# Patient Record
Sex: FEMALE | Race: WHITE | URBAN - METROPOLITAN AREA
[De-identification: names, ages, dates, MRNs, and addresses within clinical notes are randomized per-mention and may not be internally consistent; named-entity substitution may affect disease eponyms.]

---

## 2020-11-26 ENCOUNTER — OFFICE VISIT (OUTPATIENT)
Dept: URBAN - METROPOLITAN AREA CLINIC 37 | Facility: CLINIC | Age: 65
End: 2020-11-26

## 2020-12-01 ENCOUNTER — TELEPHONE ENCOUNTER (OUTPATIENT)
Dept: URBAN - METROPOLITAN AREA CLINIC 35 | Facility: CLINIC | Age: 65
End: 2020-12-01

## 2020-12-03 ENCOUNTER — LAB OUTSIDE AN ENCOUNTER (OUTPATIENT)
Dept: URBAN - METROPOLITAN AREA CLINIC 23 | Facility: CLINIC | Age: 65
End: 2020-12-03

## 2020-12-03 ENCOUNTER — OFFICE VISIT (OUTPATIENT)
Dept: URBAN - METROPOLITAN AREA CLINIC 12 | Facility: CLINIC | Age: 65
End: 2020-12-03
Payer: COMMERCIAL

## 2020-12-03 ENCOUNTER — DASHBOARD ENCOUNTERS (OUTPATIENT)
Age: 65
End: 2020-12-03

## 2020-12-03 DIAGNOSIS — K57.90 DIVERTICULOSIS: ICD-10-CM

## 2020-12-03 DIAGNOSIS — K92.2 GI BLEED: ICD-10-CM

## 2020-12-03 DIAGNOSIS — D64.9 ANEMIA: ICD-10-CM

## 2020-12-03 LAB
ABSOLUTE BASOPHIL COUNT: 0.04
ABSOLUTE EOSINOPHIL COUNT: 0.05
ABSOLUTE IMMATURE GRANULOCYTE  COUNT: 0.02
ABSOLUTE LYMPHOCYTE COUNT: 1.37
ABSOLUTE MONOCYTE COUNT: 0.36
ABSOLUTE NEUTROPHIL COUNT (ANC): 4.9
ABSOLUTE NRBC  COUNT: 0
BASOPHIL AUTO: 1
EOS AUTO: 1
HCT: 35.6
HGB: 11.6
IMMATURE GRANULOCYTES AUTO: 0.3
LYMPH AUTO: 20
MCH: 31.5
MCHC: 32.6
MCV: 96.7
MONO AUTO: 5
MPV: 12.6
NEUTRO AUTO: 73
NRBC AUTO: 0
PERFORMING LAB: (no result)
PERFORMING LAB: (no result)
PLATELETS: 143
RBC: 3.68
RDW: 13.9
WBC: 6.7

## 2020-12-03 PROCEDURE — G8420 CALC BMI NORM PARAMETERS: HCPCS | Performed by: INTERNAL MEDICINE

## 2020-12-03 PROCEDURE — 99204 OFFICE O/P NEW MOD 45 MIN: CPT | Performed by: INTERNAL MEDICINE

## 2020-12-03 PROCEDURE — G8482 FLU IMMUNIZE ORDER/ADMIN: HCPCS | Performed by: INTERNAL MEDICINE

## 2020-12-03 PROCEDURE — 3017F COLORECTAL CA SCREEN DOC REV: CPT | Performed by: INTERNAL MEDICINE

## 2020-12-03 PROCEDURE — G9903 PT SCRN TBCO ID AS NON USER: HCPCS | Performed by: INTERNAL MEDICINE

## 2020-12-03 PROCEDURE — G8427 DOCREV CUR MEDS BY ELIG CLIN: HCPCS | Performed by: INTERNAL MEDICINE

## 2020-12-03 NOTE — HPI-TODAY'S VISIT:
64-year-old female presented today for Follow-up after recent admission to [facility] November 27, 2020 for acute onset abdominal pain and hematochezia, she was admitted to the hospital for lower Gi bleeding , she had emergency colonoscopy was told she has diverticulosis of possible colitis. I do not have the actual report at the time of this visit, she feels better now but  her hemoglobin at the hospital dropped from 12 to 8, she has not had  recurrent bleeding since discharge she was told she had a Follow-up colonoscopy with better preparation. No abdominal pain at this time no nausea no vomiting

## 2020-12-04 ENCOUNTER — WEB ENCOUNTER (OUTPATIENT)
Dept: URBAN - METROPOLITAN AREA MEDICAL CENTER 27 | Facility: MEDICAL CENTER | Age: 65
End: 2020-12-04

## 2020-12-06 PROBLEM — 398050005 DIVERTICULAR DISEASE OF COLON: Status: ACTIVE | Noted: 2020-12-03

## 2020-12-28 ENCOUNTER — CLAIMS CREATED FROM THE CLAIM WINDOW (OUTPATIENT)
Dept: URBAN - METROPOLITAN AREA CLINIC 4 | Facility: CLINIC | Age: 65
End: 2020-12-28
Payer: COMMERCIAL

## 2020-12-28 ENCOUNTER — OFFICE VISIT (OUTPATIENT)
Dept: URBAN - METROPOLITAN AREA SURGERY CENTER 15 | Facility: SURGERY CENTER | Age: 65
End: 2020-12-28
Payer: COMMERCIAL

## 2020-12-28 DIAGNOSIS — D50.9 ANEMIA, IRON DEFICIENCY: ICD-10-CM

## 2020-12-28 DIAGNOSIS — K63.89 OTHER SPECIFIED DISEASES OF INTESTINE: ICD-10-CM

## 2020-12-28 DIAGNOSIS — K92.2 ACUTE GASTROINTESTINAL BLEEDING: ICD-10-CM

## 2020-12-28 DIAGNOSIS — K62.1 DYSPLASTIC POLYP OF RECTUM: ICD-10-CM

## 2020-12-28 PROCEDURE — G9937 DIG OR SURV COLSCO: HCPCS | Performed by: INTERNAL MEDICINE

## 2020-12-28 PROCEDURE — 88305 TISSUE EXAM BY PATHOLOGIST: CPT | Performed by: PATHOLOGY

## 2020-12-28 PROCEDURE — 45380 COLONOSCOPY AND BIOPSY: CPT | Performed by: INTERNAL MEDICINE

## 2020-12-28 PROCEDURE — G8907 PT DOC NO EVENTS ON DISCHARG: HCPCS | Performed by: INTERNAL MEDICINE

## 2025-02-27 ENCOUNTER — OFFICE VISIT (OUTPATIENT)
Dept: URBAN - METROPOLITAN AREA CLINIC 12 | Facility: CLINIC | Age: 70
End: 2025-02-27
Payer: MEDICARE

## 2025-02-27 VITALS
HEIGHT: 61 IN | DIASTOLIC BLOOD PRESSURE: 91 MMHG | BODY MASS INDEX: 23.98 KG/M2 | WEIGHT: 127 LBS | TEMPERATURE: 97.5 F | SYSTOLIC BLOOD PRESSURE: 153 MMHG | HEART RATE: 71 BPM

## 2025-02-27 DIAGNOSIS — K57.90 DIVERTICULOSIS: ICD-10-CM

## 2025-02-27 DIAGNOSIS — R13.19 OTHER DYSPHAGIA: ICD-10-CM

## 2025-02-27 DIAGNOSIS — R05.3 CHRONIC COUGH: ICD-10-CM

## 2025-02-27 DIAGNOSIS — Z87.19 H/O GASTROINTESTINAL DIVERTICULAR HEMORRHAGE: ICD-10-CM

## 2025-02-27 PROBLEM — 275551007: Status: ACTIVE | Noted: 2025-02-27

## 2025-02-27 PROBLEM — 68154008: Status: ACTIVE | Noted: 2025-02-27

## 2025-02-27 PROCEDURE — 99204 OFFICE O/P NEW MOD 45 MIN: CPT

## 2025-02-27 RX ORDER — FLUTICASONE PROPIONATE AND SALMETEROL 50; 250 UG/1; UG/1
1 PUFF POWDER RESPIRATORY (INHALATION) TWICE A DAY
Status: ACTIVE | COMMUNITY

## 2025-02-27 NOTE — HPI-TODAY'S VISIT:
Patient is a 68yo female presents today for an EGD/colon consult. She was last seen in the office on 12/2020 by Dr. Dave for follow-up on hospitalization d/t diverticular bleed. F/u colon in 12/2020: IH, diverticulosis, 1 hyperplastic polyp. Repeat in 5 years advised.    Denies further episode of diverticulitis since then. She is having BM once every day to every other day with some straining. Denies abdominal pain, rectal bleeding, or rectal pain. Weight and appetite has been stable  She reports chronic cough for the past 8 months, and choking on her saliva and liquids.  Denies dysphagia with solid foods, or odynophagia.  She is constantly clearing her throat but denies acid reflux, heartburn, nausea/vomiting, epigastric pain, or melena.  She was evaluated by her PCP and had negative neck/thyroid MRI, pulmonology workup s/p trial of inhaler and nasal spray without symptom relief.  She has been taking pantoprazole 40 mg for over a week now without significant symptom improvement.  Denies NSAIDs use, or alcohol consumption.  Denies family history of esophageal issues/cancer.  Denies previous EGD. Denies problems with heart, lungs, or kidneys.  No trouble with anesthesia in the past.

## 2025-04-09 ENCOUNTER — TELEPHONE ENCOUNTER (OUTPATIENT)
Dept: URBAN - METROPOLITAN AREA CLINIC 12 | Facility: CLINIC | Age: 70
End: 2025-04-09

## 2025-04-17 ENCOUNTER — TELEPHONE ENCOUNTER (OUTPATIENT)
Dept: URBAN - METROPOLITAN AREA CLINIC 23 | Facility: CLINIC | Age: 70
End: 2025-04-17

## 2025-04-17 ENCOUNTER — CLAIMS CREATED FROM THE CLAIM WINDOW (OUTPATIENT)
Dept: URBAN - METROPOLITAN AREA SURGERY CENTER 15 | Facility: SURGERY CENTER | Age: 70
End: 2025-04-17
Payer: MEDICARE

## 2025-04-17 ENCOUNTER — LAB OUTSIDE AN ENCOUNTER (OUTPATIENT)
Dept: URBAN - METROPOLITAN AREA CLINIC 23 | Facility: CLINIC | Age: 70
End: 2025-04-17

## 2025-04-17 ENCOUNTER — CLAIMS CREATED FROM THE CLAIM WINDOW (OUTPATIENT)
Dept: URBAN - METROPOLITAN AREA CLINIC 4 | Facility: CLINIC | Age: 70
End: 2025-04-17
Payer: MEDICARE

## 2025-04-17 DIAGNOSIS — K62.5 ANAL BLEEDING: ICD-10-CM

## 2025-04-17 DIAGNOSIS — K31.A15 GASTRIC INTESTINAL METAPLASIA WITHOUT DYSPLASIA, INVOLVING MULTIPLE SITES: ICD-10-CM

## 2025-04-17 DIAGNOSIS — K63.89 OTHER SPECIFIED DISEASES OF INTESTINE: ICD-10-CM

## 2025-04-17 DIAGNOSIS — K21.9 ACID REFLUX: ICD-10-CM

## 2025-04-17 DIAGNOSIS — K31.A11 GASTRIC INTESTINAL METAPLASIA WITHOUT DYSPLASIA, INVOLVING THE ANTRUM: ICD-10-CM

## 2025-04-17 DIAGNOSIS — K63.89 OTHER SPECIFIED DISORDER OF INTESTINES: ICD-10-CM

## 2025-04-17 DIAGNOSIS — K21.9 GASTRO-ESOPHAGEAL REFLUX DISEASE WITHOUT ESOPHAGITIS: ICD-10-CM

## 2025-04-17 DIAGNOSIS — K62.5 RECTAL BLEEDING: ICD-10-CM

## 2025-04-17 DIAGNOSIS — R13.19 CERVICAL DYSPHAGIA: ICD-10-CM

## 2025-04-17 DIAGNOSIS — K63.5 BENIGN COLON POLYP: ICD-10-CM

## 2025-04-17 DIAGNOSIS — K31.89 OTHER DISEASES OF STOMACH AND DUODENUM: ICD-10-CM

## 2025-04-17 DIAGNOSIS — K31.A0 GASTRIC INTESTINAL METAPLASIA, UNSPECIFIED: ICD-10-CM

## 2025-04-17 PROCEDURE — 43239 EGD BIOPSY SINGLE/MULTIPLE: CPT | Performed by: INTERNAL MEDICINE

## 2025-04-17 PROCEDURE — 45385 COLONOSCOPY W/LESION REMOVAL: CPT | Performed by: INTERNAL MEDICINE

## 2025-04-17 PROCEDURE — 88312 SPECIAL STAINS GROUP 1: CPT | Performed by: PATHOLOGY

## 2025-04-17 PROCEDURE — 88305 TISSUE EXAM BY PATHOLOGIST: CPT | Performed by: PATHOLOGY

## 2025-04-17 PROCEDURE — 00813 ANES UPR LWR GI NDSC PX: CPT

## 2025-04-17 RX ORDER — FLUTICASONE PROPIONATE AND SALMETEROL 50; 250 UG/1; UG/1
1 PUFF POWDER RESPIRATORY (INHALATION) TWICE A DAY
Status: ACTIVE | COMMUNITY

## 2025-05-01 ENCOUNTER — OFFICE VISIT (OUTPATIENT)
Dept: URBAN - METROPOLITAN AREA CLINIC 12 | Facility: CLINIC | Age: 70
End: 2025-05-01
Payer: MEDICARE

## 2025-05-01 DIAGNOSIS — K31.A0 GASTRIC INTESTINAL METAPLASIA: ICD-10-CM

## 2025-05-01 DIAGNOSIS — Z87.19 H/O GASTROINTESTINAL DIVERTICULAR HEMORRHAGE: ICD-10-CM

## 2025-05-01 DIAGNOSIS — R05.3 CHRONIC COUGH: ICD-10-CM

## 2025-05-01 DIAGNOSIS — K57.90 DIVERTICULOSIS: ICD-10-CM

## 2025-05-01 DIAGNOSIS — R13.12 OROPHARYNGEAL DYSPHAGIA: ICD-10-CM

## 2025-05-01 PROBLEM — 72519002: Status: ACTIVE | Noted: 2025-05-01

## 2025-05-01 PROBLEM — 71457002: Status: ACTIVE | Noted: 2025-05-01

## 2025-05-01 PROCEDURE — 99214 OFFICE O/P EST MOD 30 MIN: CPT

## 2025-05-01 RX ORDER — FLUTICASONE PROPIONATE AND SALMETEROL 50; 250 UG/1; UG/1
1 PUFF POWDER RESPIRATORY (INHALATION) TWICE A DAY
Status: ACTIVE | COMMUNITY

## 2025-05-01 NOTE — HPI-TODAY'S VISIT:
Patient is a 69-year-old female presents today for EGD/colon follow-up.  She was seen by me on 2/27/2025 for chronic cough for the past 8 months, and choking on saliva and liquids.  History of diverticular bleed in 2020.    EGD/colon on 4/17/2025 with Dr. Dave   Colon showed 1 benign mucosal polyp, diverticulosis in the sigmoid and in the descending colon, IH.  Repeat in 5 years advised.     EGD revealed gastritis.  Path showed reflux type changes in esophagus, focal intestinal metaplasia in gastric biopsy, negative for HP.  No celiac.  Repeat in 3 years advised  Modified barium swallow study was ordered by Dr. Dave after EGD for further evaluation.  Today patient reports feeling well overall.  Denies any GERD symptoms including acid reflux, heartburn, epigastric pain, nausea/vomiting, or melena.  Continues to have intermittent throat clearing, cough and choking on her saliva and liquids.  Her modified barium swallow study scheduled for next week.  Denies any new concerns or symptoms today.   Last OV note from 2/27/25 Patient is a 70yo female presents today for an EGD/colon consult. She was last seen in the office on 12/2020 by Dr. Dave for follow-up on hospitalization d/t diverticular bleed. F/u colon in 12/2020: IH, diverticulosis, 1 hyperplastic polyp. Repeat in 5 years advised.    Denies further episode of diverticulitis since then. She is having BM once every day to every other day with some straining. Denies abdominal pain, rectal bleeding, or rectal pain. Weight and appetite has been stable  She reports chronic cough for the past 8 months, and choking on her saliva and liquids.  Denies dysphagia with solid foods, or odynophagia.  She is constantly clearing her throat but denies acid reflux, heartburn, nausea/vomiting, epigastric pain, or melena.  She was evaluated by her PCP and had negative neck/thyroid MRI, pulmonology workup s/p trial of inhaler and nasal spray without symptom relief.  She has been taking pantoprazole 40 mg for over a week now without significant symptom improvement.  Denies NSAIDs use, or alcohol consumption.  Denies family history of esophageal issues/cancer.  Denies previous EGD. Denies problems with heart, lungs, or kidneys.  No trouble with anesthesia in the past.

## 2025-05-01 NOTE — PHYSICAL EXAM MUSCULOSKELETAL:
Subjective     Sapna Rocha is a 52 y.o. female who presents for the following: Suspicious Skin Lesion (Pt presents for lesions of concern on the scalp and the left lower back. No history of NMSC noted. ).     Review of Systems:  No other skin or systemic complaints other than what is documented elsewhere in the note.    The following portions of the chart were reviewed this encounter and updated as appropriate:       Skin Cancer History  Biopsy Log Book  No skin cancers from Specimen Tracking.    Additional History      Specialty Problems    None    Past Medical History:  Sapna Rocah  has no past medical history on file.    Past Surgical History:  Sapna Rocha  has no past surgical history on file.    Family History:  Patient family history is not on file.    Social History:  Sapna Rocha  reports that she has never smoked. She has never used smokeless tobacco. No history on file for alcohol use and drug use.    Allergies:  Latex, Penicillins, Tetanus and diphther. tox (pf), and Tetanus toxoid    Current Medications / CAM's:  Current Medications[1]     Objective   Well appearing patient in no apparent distress; mood and affect are within normal limits.    A full examination was performed including scalp, head, eyes, ears, nose, lips, neck, chest, axillae, abdomen, back, buttocks, bilateral upper extremities, bilateral lower extremities, hands, feet, fingers, toes, fingernails, and toenails. All findings within normal limits unless otherwise noted below.    Assessment/Plan   Skin Exam  1. NEOPLASM OF UNCERTAIN BEHAVIOR OF SKIN  Left Parietal Scalp  1.5cm scaly, verrucous plaque with asymmetric dark globules on periphery and polymorphic vessels    Lesion biopsy  Type of biopsy: tangential    Informed consent: discussed and consent obtained    Timeout: patient name, date of birth, surgical site, and procedure verified    Procedure prep:  Patient was prepped and draped  Anesthesia: the lesion was anesthetized in a  normal gait and station, no deformities standard fashion    Anesthetic:  1% lidocaine w/ epinephrine 1-100,000 local infiltration  Instrument used: DermaBlade    Hemostasis achieved with: aluminum chloride    Outcome: patient tolerated procedure well    Post-procedure details: sterile dressing applied and wound care instructions given    Dressing type: petrolatum and bandage    Specimen 1 - Dermatopathology- DERM LAB  Differential Diagnosis: ISK vs irritated nevus vs cyst vs MM vs other  Check Margins Yes/No?:    Comments:    Dermpath Lab: Routine Histopathology (formalin-fixed tissue)  -  Discussed differential with patient.   - Given uncertainty of clinical diagnosis, a biopsy is recommended in clinic today.   - The patient expressed understanding, is in agreement with this plan, and wishes to proceed with biopsy.   - Oral and written wound care instructions provided.   - Advised the patient that the office will call within 2 weeks to discuss biopsy results.      Please call me if there are any changes or development of concerning symptoms (lesion/skin condition is changing, bleeding, enlarging, or worsening).           [1]   Current Outpatient Medications:     bran-gum-fib-zhanna-psyl-kelp-pec (Fiber 6) 1,000 mg tablet, , Disp: , Rfl:     cholecalciferol (Vitamin D-3) 25 MCG (1000 UT) capsule, Take 1 capsule (25 mcg) by mouth once daily., Disp: , Rfl:     cyanocobalamin (Vitamin B-12) 100 mcg tablet, Take 1 tablet (100 mcg) by mouth once daily., Disp: , Rfl:     montelukast (Singulair) 10 mg tablet, Take 1 tablet (10 mg) by mouth once daily at bedtime., Disp: 30 tablet, Rfl: 0

## 2025-05-07 ENCOUNTER — LAB OUTSIDE AN ENCOUNTER (OUTPATIENT)
Dept: URBAN - METROPOLITAN AREA CLINIC 23 | Facility: CLINIC | Age: 70
End: 2025-05-07

## 2025-05-12 ENCOUNTER — WEB ENCOUNTER (OUTPATIENT)
Dept: URBAN - METROPOLITAN AREA CLINIC 23 | Facility: CLINIC | Age: 70
End: 2025-05-12

## 2025-05-13 ENCOUNTER — OFFICE VISIT (OUTPATIENT)
Dept: URBAN - METROPOLITAN AREA CLINIC 12 | Facility: CLINIC | Age: 70
End: 2025-05-13

## 2025-05-14 ENCOUNTER — OFFICE VISIT (OUTPATIENT)
Dept: URBAN - METROPOLITAN AREA CLINIC 12 | Facility: CLINIC | Age: 70
End: 2025-05-14
Payer: MEDICARE

## 2025-05-14 DIAGNOSIS — K21.9 CHRONIC GERD: ICD-10-CM

## 2025-05-14 DIAGNOSIS — K57.30 COLON, DIVERTICULOSIS: ICD-10-CM

## 2025-05-14 DIAGNOSIS — K31.A0 GASTRIC INTESTINAL METAPLASIA: ICD-10-CM

## 2025-05-14 DIAGNOSIS — R05.3 CHRONIC COUGH: ICD-10-CM

## 2025-05-14 DIAGNOSIS — R13.12 OROPHARYNGEAL DYSPHAGIA: ICD-10-CM

## 2025-05-14 DIAGNOSIS — Z87.19 H/O GASTROINTESTINAL DIVERTICULAR HEMORRHAGE: ICD-10-CM

## 2025-05-14 PROCEDURE — 99214 OFFICE O/P EST MOD 30 MIN: CPT

## 2025-05-14 RX ORDER — ESOMEPRAZOLE MAGNESIUM 20 MG/1
1 CAPSULE CAPSULE, DELAYED RELEASE PELLETS ORAL ONCE A DAY
Qty: 90 CAPSULE | Refills: 3 | OUTPATIENT
Start: 2025-05-14

## 2025-05-14 RX ORDER — FLUTICASONE PROPIONATE AND SALMETEROL 50; 250 UG/1; UG/1
1 PUFF POWDER RESPIRATORY (INHALATION) TWICE A DAY
Status: ACTIVE | COMMUNITY

## 2025-05-14 NOTE — HPI-TODAY'S VISIT:
69-year-old female presents today for follow-up after a modified barium swallow study due to choking on liquids and saliva, as well as frequent throat clearing. The modified barium swallow study performed on 5/7/2025 revealed transient to trace penetration without aspiration. Pharyngeal clearance was good. She does not appear to be at high risk for aspiration. A regular diet as tolerated was recommended.  Today, the patient reports improvement in her coughing/throat clearing and episodes of choking on saliva and liquids. However, she has noticed an increase in acid reflux, regurgitation, and burping. She recently learned that her maternal grandmother had a history of gastric cancer and is concerned about her own risk, particularly given her known history of intestinal metaplasia. She denies any new or additional symptoms at this time.  Last OV note from 5/1/25 Patient is a 69-year-old female presents today for EGD/colon follow-up.  She was seen by me on 2/27/2025 for chronic cough for the past 8 months, and choking on saliva and liquids.  History of diverticular bleed in 2020.    EGD/colon on 4/17/2025 with Dr. Dave   Colon showed 1 benign mucosal polyp, diverticulosis in the sigmoid and in the descending colon, IH.  Repeat in 5 years advised.     EGD revealed gastritis.  Path showed reflux type changes in esophagus, focal intestinal metaplasia in gastric biopsy, negative for HP.  No celiac.  Repeat in 3 years advised  Modified barium swallow study was ordered by Dr. Dave after EGD for further evaluation.  Today patient reports feeling well overall.  Denies any GERD symptoms including acid reflux, heartburn, epigastric pain, nausea/vomiting, or melena.  Continues to have intermittent throat clearing, cough and choking on her saliva and liquids.  Her modified barium swallow study scheduled for next week.  Denies any new concerns or symptoms today.

## 2025-05-19 ENCOUNTER — TELEPHONE ENCOUNTER (OUTPATIENT)
Dept: URBAN - METROPOLITAN AREA CLINIC 6 | Facility: CLINIC | Age: 70
End: 2025-05-19

## 2025-05-19 RX ORDER — LANSOPRAZOLE 15 MG/1
1 CAPSULE 1/2 TO 1 HOUR BEFORE MORNING MEAL CAPSULE, DELAYED RELEASE ORAL ONCE A DAY
Qty: 90 | Refills: 1 | OUTPATIENT
Start: 2025-05-19